# Patient Record
Sex: FEMALE | Race: BLACK OR AFRICAN AMERICAN | Employment: FULL TIME | URBAN - METROPOLITAN AREA
[De-identification: names, ages, dates, MRNs, and addresses within clinical notes are randomized per-mention and may not be internally consistent; named-entity substitution may affect disease eponyms.]

---

## 2020-07-21 ENCOUNTER — OFFICE VISIT (OUTPATIENT)
Dept: URGENT CARE | Facility: URGENT CARE | Age: 45
End: 2020-07-21
Payer: OTHER MISCELLANEOUS

## 2020-07-21 VITALS
BODY MASS INDEX: 31.65 KG/M2 | HEIGHT: 65 IN | HEART RATE: 77 BPM | WEIGHT: 190 LBS | SYSTOLIC BLOOD PRESSURE: 131 MMHG | OXYGEN SATURATION: 99 % | DIASTOLIC BLOOD PRESSURE: 91 MMHG

## 2020-07-21 DIAGNOSIS — S80.11XA CONTUSION OF LEG, RIGHT, INITIAL ENCOUNTER: Primary | ICD-10-CM

## 2020-07-21 PROCEDURE — 99203 OFFICE O/P NEW LOW 30 MIN: CPT | Performed by: FAMILY MEDICINE

## 2020-07-21 ASSESSMENT — MIFFLIN-ST. JEOR: SCORE: 1517.71

## 2020-07-22 NOTE — PROGRESS NOTES
"Subjective:   Malka Shin is a 45 year old female who presents for   Chief Complaint   Patient presents with     Trauma     Pt in clinic to have eval for right leg brusing and swelling due to work injury.     Urgent Care     Seen in urgent care 7/12/20 for the initial injury assessment since then she has ongoing discomfort of this lower leg in the anterior/medial side. Small/firm area feels a little hard. Able to walk fine but touching the area hurts.   No hx of blood clots. No pain of the posterior calf region . Able to walk without difficulty, has been able to go to work just fine.     There are no active problems to display for this patient.      No current outpatient medications on file.     No current facility-administered medications for this visit.        ROS:  As above per HPI    Objective:   BP (!) 131/91   Pulse 77   Ht 1.651 m (5' 5\")   Wt 86.2 kg (190 lb)   SpO2 99%   BMI 31.62 kg/m  , Body mass index is 31.62 kg/m .  Gen:  NAD, well-nourished, sitting in chair comfortably  HEENT: EOMI, sclera anicteric, Head normocephalic, ; nares patent; mosit mucous membranes  Neck: trachea midline, no thyromegaly  CV:  Hemodynamically stable  Pulm:  no increased work of breathing   Extrem: no cyanosis, edema or clubbing  Skin: no obvious rashes or abnormalities  Psych: Euthymic, linear thoughts, normal rate of speech  R foot: full movement in flexion and dorsiflexion  R leg: palpable tissue that is moderately firm in this area of the lower leg anterior/medial (mid-way up leg)            No results found for any visits on 07/21/20.    Assessment & Plan:   Malka Shin, 45 year old female who presents with:  Contusion of leg, right, initial encounter  Suspect collection of fluid or hematoma over this reported area of discomfort. No worsening of pain or migration of discomfort or any discomfort in posterior area thus suspicion for blood clot is low.   Given duration of symptoms will proceed  With " ultrasound of the soft tissue area.   Suspicion for infection low at this time  Ddx: hematoma, fluid collection, superficial thrombophlebitis, tissue contusion  - US Extremity Non Vascular Right      Mikhail Gray MD   [facility]    The use of Dragon/LatamLeap dictation services may have been used to construct the content in this note; any grammatical or spelling errors are non-intentional. Please contact the author of this note directly if you are in need of any clarification.

## 2020-07-22 NOTE — PATIENT INSTRUCTIONS
Schedule your ultrasound to be done call [phone] during business hours to have this done (they will find a location closest to you)     If the pain begins to move upwards or if you have worsening pain/symptoms please return to be evaluated      Intermittently apply heat for 10-15 minutes to the area and gently massage the area    Tylenol as needed for pain every 4-6 hours

## 2020-08-03 ENCOUNTER — ANCILLARY PROCEDURE (OUTPATIENT)
Dept: ULTRASOUND IMAGING | Facility: CLINIC | Age: 45
End: 2020-08-03
Attending: FAMILY MEDICINE
Payer: OTHER MISCELLANEOUS

## 2020-08-03 DIAGNOSIS — S80.11XA CONTUSION OF LEG, RIGHT, INITIAL ENCOUNTER: ICD-10-CM

## 2020-08-06 NOTE — RESULT ENCOUNTER NOTE
Pt was called and notified of results and advised to use cool compress. Rest and stretching.  Pt states she understands and will call clinic with any questions or concerns.  Bernice Malik/ MA